# Patient Record
Sex: MALE | Race: BLACK OR AFRICAN AMERICAN | NOT HISPANIC OR LATINO | URBAN - METROPOLITAN AREA
[De-identification: names, ages, dates, MRNs, and addresses within clinical notes are randomized per-mention and may not be internally consistent; named-entity substitution may affect disease eponyms.]

---

## 2017-12-12 ENCOUNTER — HOSPITAL ENCOUNTER (EMERGENCY)
Facility: HOSPITAL | Age: 37
Discharge: HOME | End: 2017-12-12

## 2017-12-12 VITALS
HEART RATE: 66 BPM | DIASTOLIC BLOOD PRESSURE: 78 MMHG | OXYGEN SATURATION: 98 % | HEIGHT: 72 IN | TEMPERATURE: 98 F | BODY MASS INDEX: 22.21 KG/M2 | RESPIRATION RATE: 18 BRPM | WEIGHT: 164 LBS | SYSTOLIC BLOOD PRESSURE: 130 MMHG

## 2017-12-12 DIAGNOSIS — S16.1XXA STRAIN OF NECK MUSCLE, INITIAL ENCOUNTER: ICD-10-CM

## 2017-12-12 DIAGNOSIS — M54.2 NECK PAIN: Primary | ICD-10-CM

## 2017-12-12 PROCEDURE — 99283 EMERGENCY DEPT VISIT LOW MDM: CPT

## 2017-12-12 PROCEDURE — 25000003 PHARM REV CODE 250: Performed by: NURSE PRACTITIONER

## 2017-12-12 RX ORDER — NAPROXEN SODIUM 275 MG/1
550 TABLET ORAL
Status: COMPLETED | OUTPATIENT
Start: 2017-12-12 | End: 2017-12-12

## 2017-12-12 RX ORDER — METHOCARBAMOL 750 MG/1
750 TABLET, FILM COATED ORAL 3 TIMES DAILY
Qty: 15 TABLET | Refills: 0 | Status: SHIPPED | OUTPATIENT
Start: 2017-12-12 | End: 2017-12-17

## 2017-12-12 RX ORDER — METHOCARBAMOL 500 MG/1
500 TABLET, FILM COATED ORAL
Status: COMPLETED | OUTPATIENT
Start: 2017-12-12 | End: 2017-12-12

## 2017-12-12 RX ORDER — NAPROXEN SODIUM 550 MG/1
550 TABLET ORAL 2 TIMES DAILY WITH MEALS
Qty: 20 TABLET | Refills: 0 | Status: SHIPPED | OUTPATIENT
Start: 2017-12-12 | End: 2020-01-19 | Stop reason: CLARIF

## 2017-12-12 RX ADMIN — METHOCARBAMOL 500 MG: 500 TABLET ORAL at 06:12

## 2017-12-12 RX ADMIN — NAPROXEN SODIUM 550 MG: 275 TABLET ORAL at 06:12

## 2017-12-12 NOTE — ED PROVIDER NOTES
"Encounter Date: 12/12/2017       History     Chief Complaint   Patient presents with    Neck Pain     Pt states has had neck pain x 2 months states "icy hot patches not working on it."  Pt denies injury or trauma.      37-year-old male presents to emergency room complaining of neck pain for 2 months.  States he has been using icy hot patches because he thought he strained the muscles in his neck however he is not getting any relief.  Denies any injuries to the neck.  States pain is reproducible with palpation of both sides of the neck and movement of the neck.  Patient denies any spinal tenderness.  Denies any difficulty breathing.  States he "cracks his neck often" and thinks he injured his neck doing so.          Review of patient's allergies indicates:  No Known Allergies  History reviewed. No pertinent past medical history.  History reviewed. No pertinent surgical history.  Family History   Problem Relation Age of Onset    Diabetes Mother     No Known Problems Father     Diabetes Sister     Diabetes Maternal Aunt     Diabetes Maternal Uncle      Social History   Substance Use Topics    Smoking status: Never Smoker    Smokeless tobacco: Never Used    Alcohol use Yes      Comment: occ     Review of Systems   Constitutional: Negative for fever.   HENT: Negative for sore throat.    Respiratory: Negative for shortness of breath.    Cardiovascular: Negative for chest pain.   Gastrointestinal: Negative for nausea.   Genitourinary: Negative for dysuria.   Musculoskeletal: Positive for neck pain and neck stiffness. Negative for back pain.   Skin: Negative for rash.   Neurological: Negative for weakness.   Hematological: Does not bruise/bleed easily.   All other systems reviewed and are negative.      Physical Exam     Initial Vitals [12/12/17 1722]   BP Pulse Resp Temp SpO2   130/78 66 18 98.1 °F (36.7 °C) 98 %      MAP       95.33         Physical Exam    Nursing note and vitals reviewed.  Constitutional: He " appears well-developed and well-nourished. He is not diaphoretic. No distress.   HENT:   Head: Normocephalic and atraumatic.   Eyes: Conjunctivae are normal.   Neck: Normal range of motion. Neck supple. Muscular tenderness present. No spinous process tenderness present. No edema and normal range of motion present. No neck rigidity.       Cardiovascular: Normal rate and regular rhythm.   Pulmonary/Chest: Breath sounds normal. No respiratory distress. He exhibits no tenderness.   Abdominal: Soft. He exhibits no distension. There is no tenderness.   Musculoskeletal: Normal range of motion. He exhibits no tenderness.   Neurological: He is alert and oriented to person, place, and time. He has normal strength. No cranial nerve deficit or sensory deficit.   Skin: Skin is warm and dry. Capillary refill takes less than 2 seconds.   Psychiatric: He has a normal mood and affect. His behavior is normal. Judgment and thought content normal.         ED Course   Procedures  Labs Reviewed - No data to display   Imaging Results          X-Ray Cervical Spine AP And Lateral (Final result)  Result time 12/12/17 18:08:09    Final result by LUIS Dewitt Sr., MD (12/12/17 18:08:09)                 Impression:         Normal study.      Electronically signed by: LUIS DEWITT MD  Date:     12/12/17  Time:    18:08              Narrative:    4 view x-ray of the cervical spine    Clinical History:   Cervicalgia    Finding: There is no fracture, spondylolisthesis, or scoliosis. There is normal cervical lordosis. The prevertebral soft tissue space is normal in appearance.                                      Medical Decision Making:   Initial Assessment:   37-year-old male presents to emergency room complaining of neck pain for 2 months.  States he has been using icy hot patches because he thought he strained the muscles in his neck however he is not getting any relief.  Denies any injuries to the neck.  States pain is reproducible with  "palpation of both sides of the neck and movement of the neck.  Patient denies any spinal tenderness.  Denies any difficulty breathing.  States he "cracks his neck often" and thinks he injured his neck doing so.  Patient is awake alert oriented ×3.  Answer questions appropriately.  Behaving appropriately.  No numbness or tingling of extremities.  Normal strength in upper extremities.  Radial pulse +2 bilaterally.    Differential Diagnosis:   Muscle spasms, neck strain, cervical radiculopathy, fracture, neck contusion,  Clinical Tests:   Radiological Study: Ordered and Reviewed  ED Management:  X-rays negative for any findings.  Patient given medications emergency room.  Discharge with muscle relaxer and anti-inflammatory.  Instructed to ice area practice range of motion exercises of her neck.  Follow-up primary care doctor if symptoms continue.                   ED Course      Clinical Impression:   The primary encounter diagnosis was Neck pain. A diagnosis of Strain of neck muscle, initial encounter was also pertinent to this visit.                           Ernestina Hernadez NP  12/19/17 1213       Prasanna Randhawa MD  12/31/17 0708    "

## 2017-12-12 NOTE — ED NOTES
Patient identifiers verified and correct for Cole Penny.    LOC: The patient is awake, alert and aware of environment with an appropriate affect, the patient is oriented x 3 and speaking appropriately.  APPEARANCE: Patient resting comfortably and in no acute distress, patient is clean and well groomed, patient's clothing is properly fastened.  SKIN: The skin is warm and dry, color consistent with ethnicity, patient has normal skin turgor and moist mucus membranes, skin intact, no breakdown or bruising noted.  MUSCULOSKELETAL: Patient moving all extremities spontaneously, no obvious swelling or deformities noted. Increased pain when turning neck from side to side.

## 2017-12-12 NOTE — ED NOTES
Patient came in with a c/o neck pain which started 2 months ago, patient states that the pain is worst when he turns his neck from side to side. Patient denies any injury.

## 2020-01-19 ENCOUNTER — HOSPITAL ENCOUNTER (EMERGENCY)
Facility: HOSPITAL | Age: 40
Discharge: HOME | End: 2020-01-19
Attending: EMERGENCY MEDICINE

## 2020-01-19 VITALS
RESPIRATION RATE: 19 BRPM | SYSTOLIC BLOOD PRESSURE: 130 MMHG | DIASTOLIC BLOOD PRESSURE: 70 MMHG | BODY MASS INDEX: 25.33 KG/M2 | TEMPERATURE: 98 F | WEIGHT: 187 LBS | OXYGEN SATURATION: 99 % | HEART RATE: 65 BPM | HEIGHT: 72 IN

## 2020-01-19 DIAGNOSIS — S63.632A SPRAIN OF INTERPHALANGEAL JOINT OF RIGHT MIDDLE FINGER, INITIAL ENCOUNTER: Primary | ICD-10-CM

## 2020-01-19 PROCEDURE — 29130 APPL FINGER SPLINT STATIC: CPT

## 2020-01-19 PROCEDURE — 99283 EMERGENCY DEPT VISIT LOW MDM: CPT | Mod: 25,ER

## 2020-01-19 RX ORDER — IBUPROFEN 600 MG/1
600 TABLET ORAL EVERY 6 HOURS PRN
Qty: 21 TABLET | Refills: 0 | Status: SHIPPED | OUTPATIENT
Start: 2020-01-19

## 2020-01-19 NOTE — ED NOTES
Pt ambulatory to rm 10. NIYA COLON  Pt swinging arm dependently and educated on dependent edema to injury site.

## 2020-01-20 NOTE — ED PROVIDER NOTES
"Encounter Date: 1/19/2020       History     Chief Complaint   Patient presents with    Hand Pain     Pt to Er wt c/o right middle finger pain and swelling. Pt reports "jamming it about 2 weeks ago."     HPI  Review of patient's allergies indicates:  No Known Allergies  History reviewed. No pertinent past medical history.  History reviewed. No pertinent surgical history.  Family History   Problem Relation Age of Onset    Diabetes Mother     No Known Problems Father     Diabetes Sister     Diabetes Maternal Aunt     Diabetes Maternal Uncle      Social History     Tobacco Use    Smoking status: Never Smoker    Smokeless tobacco: Never Used   Substance Use Topics    Alcohol use: Yes     Comment: occ    Drug use: No     Review of Systems   Constitutional: Negative for activity change, appetite change, chills and fever.   Musculoskeletal: Positive for joint swelling.        + right middle finger pain   Skin: Negative for color change, rash and wound.   Neurological: Negative for dizziness, weakness, numbness and headaches.   All other systems reviewed and are negative.      Physical Exam     Initial Vitals   BP Pulse Resp Temp SpO2   01/19/20 1430 01/19/20 1430 01/19/20 1541 01/19/20 1430 01/19/20 1430   134/72 69 19 98.3 °F (36.8 °C) 97 %      MAP       --                Physical Exam    Nursing note and vitals reviewed.  Constitutional: He appears well-developed and well-nourished. He appears distressed.   Cardiovascular: Normal rate, regular rhythm and intact distal pulses.   Pulmonary/Chest: Breath sounds normal. No respiratory distress.   Musculoskeletal:   Moderate swelling to the middle portion of the right middle finger. Normal ROM all joints in the finger with discomfort. No bony tenderness in the hand. Brisk capillary refill all fingers.    Neurological: He is alert and oriented to person, place, and time. He has normal strength. No sensory deficit.   Skin: Skin is warm and dry. No erythema. No " pallor.   Psychiatric: He has a normal mood and affect. His behavior is normal. Judgment and thought content normal.         ED Course   Procedures  Labs Reviewed - No data to display       Imaging Results          X-Ray Finger 2 or More Views Right (Final result)  Result time 01/19/20 14:58:57    Final result by Benedict Yusuf MD (01/19/20 14:58:57)                 Impression:      1.  Negative for acute process involving the osseous structures.  Negative for radiopaque foreign bodies or air in the soft tissues.    2.  Mild soft tissue swelling of the 3rd digit.      Electronically signed by: Benedict Yusuf MD  Date:    01/19/2020  Time:    14:58             Narrative:    EXAMINATION:  XR FINGER 2 OR MORE VIEWS RIGHT    CLINICAL HISTORY:  Finger pain;.    TECHNIQUE:  Three views of the right 3rd finger    COMPARISON:  None    FINDINGS:  There is mild soft tissue swelling of the 3rd digit. Negative for air in the soft tissues or radiopaque foreign bodies. There underlying osseous structures are intact. Joint spaces are well maintained.                                 Medical Decision Making:   Clinical Tests:   Radiological Study: Ordered and Reviewed  No acute findings on x-ray of the right middle finger.  Patient was placed in a finger splint and advised on supportive care and the need for follow-up with Orthopedics.  Return to the emergency department if worse in any way.                                 Clinical Impression:       ICD-10-CM ICD-9-CM   1. Sprain of interphalangeal joint of right middle finger, initial encounter S63.632A 842.13         Disposition:   Disposition: Discharged                     MARSHA Acuña  01/19/20 1941

## 2022-10-31 ENCOUNTER — HOSPITAL ENCOUNTER (EMERGENCY)
Facility: HOSPITAL | Age: 42
Discharge: HOME | End: 2022-10-31
Attending: EMERGENCY MEDICINE

## 2022-10-31 VITALS
SYSTOLIC BLOOD PRESSURE: 119 MMHG | HEART RATE: 64 BPM | HEIGHT: 72 IN | RESPIRATION RATE: 18 BRPM | WEIGHT: 190 LBS | OXYGEN SATURATION: 98 % | DIASTOLIC BLOOD PRESSURE: 74 MMHG | TEMPERATURE: 99 F | BODY MASS INDEX: 25.73 KG/M2

## 2022-10-31 DIAGNOSIS — M25.511 ACUTE PAIN OF RIGHT SHOULDER: ICD-10-CM

## 2022-10-31 DIAGNOSIS — S16.1XXA STRAIN OF NECK MUSCLE, INITIAL ENCOUNTER: Primary | ICD-10-CM

## 2022-10-31 PROCEDURE — 63600175 PHARM REV CODE 636 W HCPCS: Mod: ER | Performed by: PHYSICIAN ASSISTANT

## 2022-10-31 PROCEDURE — 99284 EMERGENCY DEPT VISIT MOD MDM: CPT | Mod: ER

## 2022-10-31 PROCEDURE — 96372 THER/PROPH/DIAG INJ SC/IM: CPT | Performed by: PHYSICIAN ASSISTANT

## 2022-10-31 RX ORDER — KETOROLAC TROMETHAMINE 30 MG/ML
15 INJECTION, SOLUTION INTRAMUSCULAR; INTRAVENOUS
Status: COMPLETED | OUTPATIENT
Start: 2022-10-31 | End: 2022-10-31

## 2022-10-31 RX ORDER — LIDOCAINE 50 MG/G
1 PATCH TOPICAL DAILY
Qty: 5 PATCH | Refills: 0 | Status: SHIPPED | OUTPATIENT
Start: 2022-10-31

## 2022-10-31 RX ORDER — NAPROXEN 500 MG/1
500 TABLET ORAL 2 TIMES DAILY WITH MEALS
Qty: 30 TABLET | Refills: 0 | Status: SHIPPED | OUTPATIENT
Start: 2022-10-31

## 2022-10-31 RX ORDER — METHOCARBAMOL 500 MG/1
500 TABLET, FILM COATED ORAL 3 TIMES DAILY
Qty: 15 TABLET | Refills: 0 | Status: SHIPPED | OUTPATIENT
Start: 2022-10-31 | End: 2022-11-05

## 2022-10-31 RX ADMIN — KETOROLAC TROMETHAMINE 15 MG: 30 INJECTION, SOLUTION INTRAMUSCULAR; INTRAVENOUS at 11:10

## 2022-10-31 NOTE — Clinical Note
"Cole"Royal Penny was seen and treated in our emergency department on 10/31/2022.  He may return to work on 11/01/2022.       If you have any questions or concerns, please don't hesitate to call.      Tramaine Giron PA-C"

## 2022-10-31 NOTE — ED PROVIDER NOTES
Encounter Date: 10/31/2022       History     Chief Complaint   Patient presents with    Shoulder Pain     Right shoulder pain x 3 weeks. Unknown injury     41-year-old male presents to ED with concern of right-sided shoulder and neck pain that began roughly 2-3 weeks ago.  He denies any associated injury trauma but does report increased activities at work.  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  Pain is sharp, worse with activities or movement, improved with rest knee, severity 7/10.  No numbness or focal weakness, chest pain, cough, shortness of breath, palpitations.  No previous neck or shoulder injuries or surgeries.  No other acute complaints at this time.    The history is provided by the patient.   Review of patient's allergies indicates:  No Known Allergies  History reviewed. No pertinent past medical history.  History reviewed. No pertinent surgical history.  Family History   Problem Relation Age of Onset    Diabetes Mother     No Known Problems Father     Diabetes Sister     Diabetes Maternal Aunt     Diabetes Maternal Uncle      Social History     Tobacco Use    Smoking status: Never    Smokeless tobacco: Never   Substance Use Topics    Alcohol use: Yes     Comment: occ    Drug use: No     Review of Systems   Respiratory:  Negative for cough and shortness of breath.    Cardiovascular:  Negative for chest pain and palpitations.   Musculoskeletal:  Positive for arthralgias.   Skin:  Negative for color change and rash.   Neurological:  Negative for weakness and numbness.     Physical Exam     Initial Vitals [10/31/22 1050]   BP Pulse Resp Temp SpO2   119/74 64 18 98.5 °F (36.9 °C) 98 %      MAP       --         Physical Exam    Nursing note and vitals reviewed.  Constitutional: Vital signs are normal. He appears well-developed and well-nourished. He is cooperative. He does not have a sickly appearance. He does not appear ill. No distress.    HENT:   Head: Normocephalic and atraumatic.   Eyes: EOM are normal.   Neck:   Right-sided cervical paraspinal tenderness extending to right trapezius.  No midline bony tenderness or bony palpable step-offs.  Appropriate sensation and strength into BUE.   Normal range of motion.  Musculoskeletal:      Cervical back: Normal range of motion.      Comments: Right shoulder tenderness over lateral and posterior aspect.  No physical or palpable deformities.  No overlying skin changes.  ROM is intact but pain worsens from internal/external rotation.     Neurological: He is alert. GCS eye subscore is 4. GCS verbal subscore is 5. GCS motor subscore is 6.   Psychiatric: He has a normal mood and affect. His speech is normal and behavior is normal.       ED Course   Procedures  Labs Reviewed - No data to display       Imaging Results    None          Medications   ketorolac injection 15 mg (has no administration in time range)     Medical Decision Making:   Initial Assessment:   Patient presents with 2-3 week onset right-sided neck and right-sided shoulder pain, denying any associated injury trauma but does report increased activities at his job.  Afebrile with vitals WNL.  On exam, reproducible tenderness to right-sided trapezius extending to lateral and posterior right shoulder.  No physical or palpable deformities.  Neurovascular intact.  Differential Diagnosis:   Strain, sprain, spasm, radiculopathy, neuropathy, bursitis  ED Management:  Will plan to continue with conservative care for patient's musculoskeletal pain.  IM Toradol given in ED.  Prescription for naproxen and Robaxin along with topical lidocaine patches.  Encouraged ice/heat, stretches, movements and activities as tolerated, PCP follow-up.  ED return precautions discussed.  Patient states his understanding and agrees with plan.                        Clinical Impression:   Final diagnoses:  [S16.1XXA] Strain of neck muscle, initial encounter  (Primary)  [M25.511] Acute pain of right shoulder      ED Disposition Condition    Discharge Stable          ED Prescriptions       Medication Sig Dispense Start Date End Date Auth. Provider    naproxen (NAPROSYN) 500 MG tablet Take 1 tablet (500 mg total) by mouth 2 (two) times daily with meals. 30 tablet 10/31/2022 -- Tramaine Giron PA-C    methocarbamoL (ROBAXIN) 500 MG Tab Take 1 tablet (500 mg total) by mouth 3 (three) times daily. for 5 days 15 tablet 10/31/2022 11/5/2022 Tramaine Giron PA-C    LIDOcaine (LIDODERM) 5 % Place 1 patch onto the skin once daily. Remove & Discard patch within 12 hours or as directed by MD 5 patch 10/31/2022 -- Tramaine Giron PA-C          Follow-up Information       Follow up With Specialties Details Why Contact Info    Your Doctor                 Tramaine Giron PA-C  10/31/22 [id number]

## 2022-10-31 NOTE — DISCHARGE INSTRUCTIONS
